# Patient Record
(demographics unavailable — no encounter records)

---

## 2024-11-04 NOTE — ASSESSMENT
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 He has since been doing very well and remains asymptomatic at this time. I counseled Eulalio and his mother and offered my reassurance, as there is minimal fluid around the right testicle and both testicles do feel the same size. I then informed mom that there is a fullness appreciated in the right groin, which I explained is consistent with an inguinal hernia. I educated mom about this diagnosis and reviewed the role of surgery including a laparoscopic/open inguinal hernia repair operation. I discussed the indications, risks, benefits and alternatives to the procedures. The risks discussed included but were not limited to bleeding, infection, injury to intra-abdominal/pelvic contents, injury to spermatic cord/testicular loss, postoperative hydrocele and hernia recurrence. I counseled mom about the possibility of developing an incarcerated hernia and she knows to bring Eulalio to the emergency room with any concerns. After our discussion, mom indicated her understanding and is in agreement with the plan for surgery. We will be in touch in the near future to discuss a date for surgery. Mom has my information and knows to contact me sooner with any questions or concerns.

## 2024-11-04 NOTE — ADDENDUM
[FreeTextEntry1] : Documented by Michoacano Melendez acting as a scribe for Dr. Valdez on 11/04/2024.   All medical record entries made by the Scribe were at my, Dr. Valdez, direction and personally dictated by me on 11/04/2024. I have reviewed the chart and agree that the record accurately reflects my personal performances of the history, physical exam, assessment and plan. I have also personally directed, reviewed, and agree with the instructions.

## 2024-11-04 NOTE — HISTORY OF PRESENT ILLNESS
[FreeTextEntry1] : Eulalio is a 9 year old boy who is here today after recently being admitted to [facility] [place] on 10/26-10/27 with testicular pain and swelling after being hit in the testicles 4 days prior when playing on a trampoline. A CT scan performed showed a normal appendix, right scrotal hernia containing fluid and mesenteric fat and pelvic fluid, possibly hemorrhagic. He was admitted for observation and possible hydrocelectomy repair if pain did not resolve. The right hydrocele persisted, but it was determined it could be repaired on an outpatient basis. The family presents today to discuss surgery further. Since his hospital stay, Eulalio has been doing well and he no longer appreciates any pain or nausea. He also feels that his testicular swelling has decreased. Eulalio is eating fine without emesis. No unexplained fevers or changes in energy levels.

## 2024-11-04 NOTE — CONSULT LETTER
[Dear  ___] : Dear  [unfilled], [FreeTextEntry2] : Priscila Navarro MD [FreeTextEntry3] : Mireya Valdez MD  Division of Pediatric, General, Thoracic and Endoscopic Surgery  [facility]

## 2024-11-04 NOTE — REASON FOR VISIT
[Follow-up - Scheduled] : a follow-up, scheduled visit for [Inguinal Hernia] : inguinal hernia [Hydrocele] : hydrocele [Patient] : patient [Parents] : parents [Medical Records] : medical records [Mother] : mother [FreeTextEntry4] : Priscila Navarro MD

## 2024-11-04 NOTE — HISTORY OF PRESENT ILLNESS
[FreeTextEntry1] : Eulalio is a 9 year old boy who is here today after recently being admitted to [facility] [place] on 10/26-10/27 with testicular pain and swelling after being hit in the testicles 4 days prior when playing on a trampoline. A CT scan performed showed a normal appendix, right scrotal hernia containing fluid and mesenteric fat and pelvic fluid, possibly hemorrhagic. He was admitted for observation and possible hydrocelectomy repair if pain did not resolve. The right hydrocele persisted, but it was determined it could be repaired on an outpatient basis. The family presents today to discuss surgery further. Since his hospital stay, Eulalio has been doing well and he no longer appreciates any pain or nausea. He also feels that his testicular swelling has decreased. Eulalio is eating fine without emesis. No unexplained fevers or changes in energy levels.

## 2024-11-04 NOTE — ASSESSMENT
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 He has since been doing very well and remains asymptomatic at this time. I counseled Eulalio and his mother and offered my reassurance, as there is minimal fluid around the right testicle and both testicles do feel the same size. I then informed mom that there is a fullness appreciated in the right groin, which I explained is consistent with an inguinal hernia. I educated mom about this diagnosis and reviewed the role of surgery including a laparoscopic/open inguinal hernia repair operation. I discussed the indications, risks, benefits and alternatives to the procedures. The risks discussed included but were not limited to bleeding, infection, injury to intra-abdominal/pelvic contents, injury to spermatic cord/testicular loss, postoperative hydrocele and hernia recurrence. I counseled mom about the possibility of developing an incarcerated hernia and she knows to bring Eulalio to the emergency room with any concerns. After our discussion, mom indicated her understanding and is in agreement with the plan for surgery. We will be in touch in the near future to discuss a date for surgery. Mom has my information and knows to contact me sooner with any questions or concerns.

## 2024-11-04 NOTE — CONSULT LETTER
[Dear  ___] : Dear  [unfilled], [FreeTextEntry2] : Priscila Navarro MD [FreeTextEntry3] : Mireya Valdez MD  Division of Pediatric, General, Thoracic and Endoscopic Surgery  [facility]

## 2024-11-04 NOTE — PHYSICAL EXAM
[NL] : grossly intact [TextBox_67] : Minimal fluid around the right testicle, both testicles feel the same size, right inguinal fullness palpable on exam

## 2025-03-11 NOTE — REASON FOR VISIT
[Patient] : patient [Mother] : mother [____ Month(s)] : [unfilled] month(s)  [Other: ____] : [unfilled] [Medical Records] : medical records [de-identified] : 2/10/25 [de-identified] : Dr. Valdez [de-identified] : Eulalio is a 9 year old male who underwent a laparoscopic bilateral inguinal hernia repair on 2/10/25. Since the procedure, he has been doing well and denies any pain or discomfort associated with the surgical site. He has not developed any recurrent swelling in the groin. No recent fevers or changes in energy levels noted. He is otherwise healthy and doing well.

## 2025-03-11 NOTE — CONSULT LETTER
[Dear  ___] : Dear  [unfilled], [Consult Letter:] : I had the pleasure of evaluating your patient, [unfilled]. [Please see my note below.] : Please see my note below. [Consult Closing:] : Thank you very much for allowing me to participate in the care of this patient.  If you have any questions, please do not hesitate to contact me. [Sincerely,] : Sincerely, [FreeTextEntry2] : Priscila Navarro MD [FreeTextEntry3] : Mireya Valdez MD Division of Pediatric, General, Thoracic and Endoscopic Surgery [facility]

## 2025-03-11 NOTE — CONSULT LETTER
[Dear  ___] : Dear  [unfilled], [Consult Letter:] : I had the pleasure of evaluating your patient, [unfilled]. [Please see my note below.] : Please see my note below. [Consult Closing:] : Thank you very much for allowing me to participate in the care of this patient.  If you have any questions, please do not hesitate to contact me. [Sincerely,] : Sincerely, [FreeTextEntry2] : Priscila Navarro MD [FreeTextEntry3] : Mireya Valdez MD Division of Pediatric, General, Thoracic and Endoscopic Surgery [facility]

## 2025-03-11 NOTE — REASON FOR VISIT
[Patient] : patient [Mother] : mother [____ Month(s)] : [unfilled] month(s)  [Other: ____] : [unfilled] [Medical Records] : medical records [de-identified] : 2/10/25 [de-identified] : Dr. Valdez [de-identified] : Eulalio is a 9 year old male who underwent a laparoscopic bilateral inguinal hernia repair on 2/10/25. Since the procedure, he has been doing well and denies any pain or discomfort associated with the surgical site. He has not developed any recurrent swelling in the groin. No recent fevers or changes in energy levels noted. He is otherwise healthy and doing well.

## 2025-03-11 NOTE — ADDENDUM
[FreeTextEntry1] : Documented by Michoacano Melendez acting as a scribe for Dr. Valdez on 03/03/2025.   All medical record entries made by the Scribe were at my, Dr. Valdez, direction and personally dictated by me on 03/03/2025. I have reviewed the chart and agree that the record accurately reflects my personal performances of the history, physical exam, assessment and plan. I have also personally directed, reviewed, and agree with the instructions.

## 2025-03-11 NOTE — ASSESSMENT
[FreeTextEntry1] : Eulalio is a 9 year old male who is about 3 weeks out from his laparoscopic bilateral inguinal hernia repair. He has been doing very well postoperatively and remains asymptomatic at this time. I counseled Eulalio and his mother and offered my reassurance, as the surgical sites appear to have healed very nicely without evidence of recurrence on exam today. I discussed return precautions and Eulalio can follow up with me on an as needed basis. He is cleared of all restrictions on physical activity moving forward. All questions answered. His mother has my information and knows to contact me with any questions or concerns.

## 2025-03-11 NOTE — PHYSICAL EXAM
[Testicle descended on left] : testicle descended on left [Testicle descended on right] : testicle descended on right [NL] : grossly intact [Inguinal hernia] : no inguinal hernia [TextBox_37] : Incisions are well healed [TextBox_67] : Both testicles appear to be the same size